# Patient Record
Sex: FEMALE | Race: WHITE | NOT HISPANIC OR LATINO
[De-identification: names, ages, dates, MRNs, and addresses within clinical notes are randomized per-mention and may not be internally consistent; named-entity substitution may affect disease eponyms.]

---

## 2017-01-03 ENCOUNTER — CHART COPY (OUTPATIENT)
Age: 52
End: 2017-01-03

## 2017-02-13 ENCOUNTER — APPOINTMENT (OUTPATIENT)
Dept: ORTHOPEDIC SURGERY | Facility: CLINIC | Age: 52
End: 2017-02-13

## 2017-02-13 RX ORDER — TRAMADOL HYDROCHLORIDE 50 MG/1
50 TABLET, COATED ORAL
Qty: 50 | Refills: 0 | Status: DISCONTINUED | COMMUNITY
Start: 2017-01-03 | End: 2017-02-13

## 2017-02-28 ENCOUNTER — MEDICATION RENEWAL (OUTPATIENT)
Age: 52
End: 2017-02-28

## 2017-03-07 ENCOUNTER — APPOINTMENT (OUTPATIENT)
Dept: ORTHOPEDIC SURGERY | Facility: CLINIC | Age: 52
End: 2017-03-07

## 2017-03-07 VITALS
DIASTOLIC BLOOD PRESSURE: 82 MMHG | BODY MASS INDEX: 22.22 KG/M2 | SYSTOLIC BLOOD PRESSURE: 125 MMHG | HEIGHT: 69 IN | HEART RATE: 69 BPM | WEIGHT: 150 LBS

## 2017-03-07 DIAGNOSIS — Z96.651 PRESENCE OF RIGHT ARTIFICIAL KNEE JOINT: ICD-10-CM

## 2017-03-07 DIAGNOSIS — M25.561 PAIN IN RIGHT KNEE: ICD-10-CM

## 2017-03-07 RX ORDER — ONDANSETRON 4 MG/1
4 TABLET ORAL
Qty: 20 | Refills: 0 | Status: DISCONTINUED | COMMUNITY
Start: 2016-12-06

## 2017-03-07 RX ORDER — ASPIRIN 325 MG/1
325 TABLET ORAL
Qty: 60 | Refills: 0 | Status: DISCONTINUED | COMMUNITY
Start: 2016-12-02

## 2017-03-07 RX ORDER — PANTOPRAZOLE 40 MG/1
40 TABLET, DELAYED RELEASE ORAL
Qty: 30 | Refills: 0 | Status: DISCONTINUED | COMMUNITY
Start: 2016-12-02

## 2017-03-07 RX ORDER — VALACYCLOVIR 1 G/1
1 TABLET, FILM COATED ORAL
Qty: 8 | Refills: 0 | Status: DISCONTINUED | COMMUNITY
Start: 2016-12-22

## 2018-04-18 ENCOUNTER — APPOINTMENT (OUTPATIENT)
Dept: ORTHOPEDIC SURGERY | Facility: CLINIC | Age: 53
End: 2018-04-18
Payer: MEDICARE

## 2018-04-18 VITALS
DIASTOLIC BLOOD PRESSURE: 79 MMHG | HEART RATE: 81 BPM | HEIGHT: 69 IN | BODY MASS INDEX: 25.18 KG/M2 | SYSTOLIC BLOOD PRESSURE: 119 MMHG | WEIGHT: 170 LBS

## 2018-04-18 DIAGNOSIS — Z82.61 FAMILY HISTORY OF ARTHRITIS: ICD-10-CM

## 2018-04-18 DIAGNOSIS — M24.851 OTHER SPECIFIC JOINT DERANGEMENTS OF RIGHT HIP, NOT ELSEWHERE CLASSIFIED: ICD-10-CM

## 2018-04-18 PROCEDURE — 73502 X-RAY EXAM HIP UNI 2-3 VIEWS: CPT

## 2018-04-18 PROCEDURE — 99214 OFFICE O/P EST MOD 30 MIN: CPT

## 2018-04-23 ENCOUNTER — APPOINTMENT (OUTPATIENT)
Dept: ORTHOPEDIC SURGERY | Facility: CLINIC | Age: 53
End: 2018-04-23
Payer: MEDICARE

## 2018-04-23 VITALS
HEIGHT: 69 IN | WEIGHT: 170 LBS | HEART RATE: 76 BPM | DIASTOLIC BLOOD PRESSURE: 78 MMHG | BODY MASS INDEX: 25.18 KG/M2 | SYSTOLIC BLOOD PRESSURE: 120 MMHG

## 2018-04-23 PROCEDURE — 99213 OFFICE O/P EST LOW 20 MIN: CPT

## 2018-04-23 PROCEDURE — 73562 X-RAY EXAM OF KNEE 3: CPT | Mod: RT

## 2018-12-10 ENCOUNTER — RECORD ABSTRACTING (OUTPATIENT)
Age: 53
End: 2018-12-10

## 2018-12-10 RX ORDER — AMOXICILLIN 500 MG/1
500 CAPSULE ORAL
Qty: 20 | Refills: 4 | Status: DISCONTINUED | COMMUNITY
Start: 2018-04-23 | End: 2018-12-10

## 2018-12-10 RX ORDER — AMOXICILLIN 500 MG/1
500 CAPSULE ORAL
Qty: 12 | Refills: 2 | Status: DISCONTINUED | COMMUNITY
Start: 2017-03-07 | End: 2018-12-10

## 2018-12-11 ENCOUNTER — APPOINTMENT (OUTPATIENT)
Dept: CARDIOLOGY | Facility: CLINIC | Age: 53
End: 2018-12-11
Payer: MEDICARE

## 2018-12-11 VITALS
WEIGHT: 176 LBS | OXYGEN SATURATION: 97 % | DIASTOLIC BLOOD PRESSURE: 64 MMHG | HEIGHT: 69 IN | RESPIRATION RATE: 14 BRPM | BODY MASS INDEX: 26.07 KG/M2 | HEART RATE: 80 BPM | SYSTOLIC BLOOD PRESSURE: 110 MMHG

## 2018-12-11 DIAGNOSIS — M54.2 CERVICALGIA: ICD-10-CM

## 2018-12-11 DIAGNOSIS — Z00.00 ENCOUNTER FOR GENERAL ADULT MEDICAL EXAMINATION W/OUT ABNORMAL FINDINGS: ICD-10-CM

## 2018-12-11 DIAGNOSIS — C83.30 DIFFUSE LARGE B-CELL LYMPHOMA, UNSPECIFIED SITE: ICD-10-CM

## 2018-12-11 DIAGNOSIS — R53.83 OTHER FATIGUE: ICD-10-CM

## 2018-12-11 PROCEDURE — 99204 OFFICE O/P NEW MOD 45 MIN: CPT

## 2018-12-11 PROCEDURE — 93000 ELECTROCARDIOGRAM COMPLETE: CPT

## 2018-12-11 NOTE — DISCUSSION/SUMMARY
[FreeTextEntry1] : 1. Echocardiogram to evaluate given her neck pain as well as history of chemotherapy.\par 2. Exercise stress echocardiogram to rule out a cardiac cause of her neck pain in this middle-aged female with a history of chemotherapy..\par 3. Carotid Doppler to evaluate further given the neck pain.\par 4. Patient encouraged  to find ways to reduce stress, such as meditation and/or yoga.\par 5. If testing is normal would recommend increasing exercise as a way to improve her symptoms and especially her fatigue.\par 6. Check lipids, hemoglobin A1c and CRP.\par 7. Follow up here after testing, and will make further recommendations at that time.

## 2018-12-11 NOTE — HISTORY OF PRESENT ILLNESS
[FreeTextEntry1] : Patient comes in noting since the summer she has been having episodes where she will feel a tightness around her neck almost as if someone is choking her. She has also had a lot of fatigue and is having difficulty exercising because she feels so tired. She also feels that her heart rate goes up excessively. She has gained about 20 pounds over the last year as well. She reports no actual chest pain or shortness of breath. She has no other specific symptoms. She certainly is feeling very stressed however. Patient denies palpitations, orthopnea, presyncope, syncope.

## 2018-12-11 NOTE — ASSESSMENT
[FreeTextEntry1] : EKG: Sinus rhythm with no significant ST or T wave changes.\par \par 53-year-old female with a past medical history of lymphoma status post chemotherapy who presents today for evaluation of neck pain and fatigue. Patient's symptoms are atypical and more likely represent a musculoskeletal or stress-induced cores, or possibly some component of reflux. However given her history of CHOP chemotherapy I would recommend additional cardiac evaluation at this time.

## 2018-12-13 ENCOUNTER — APPOINTMENT (OUTPATIENT)
Dept: CARDIOLOGY | Facility: CLINIC | Age: 53
End: 2018-12-13
Payer: MEDICARE

## 2018-12-13 PROCEDURE — 93351 STRESS TTE COMPLETE: CPT

## 2019-01-15 ENCOUNTER — APPOINTMENT (OUTPATIENT)
Dept: CARDIOLOGY | Facility: CLINIC | Age: 54
End: 2019-01-15
Payer: MEDICARE

## 2019-01-15 PROCEDURE — 93880 EXTRACRANIAL BILAT STUDY: CPT

## 2019-01-18 ENCOUNTER — APPOINTMENT (OUTPATIENT)
Dept: CARDIOLOGY | Facility: CLINIC | Age: 54
End: 2019-01-18
Payer: MEDICARE

## 2019-01-18 PROCEDURE — 93306 TTE W/DOPPLER COMPLETE: CPT

## 2019-02-07 ENCOUNTER — APPOINTMENT (OUTPATIENT)
Dept: INFECTIOUS DISEASE | Facility: HOSPITAL | Age: 54
End: 2019-02-07
Payer: SELF-PAY

## 2019-02-07 DIAGNOSIS — Z71.89 OTHER SPECIFIED COUNSELING: ICD-10-CM

## 2019-02-07 PROCEDURE — 90473 IMMUNE ADMIN ORAL/NASAL: CPT | Mod: NC

## 2019-02-07 PROCEDURE — 90690 TYPHOID VACCINE ORAL: CPT

## 2019-02-07 PROCEDURE — 90632 HEPA VACCINE ADULT IM: CPT

## 2019-02-07 PROCEDURE — 90472 IMMUNIZATION ADMIN EACH ADD: CPT | Mod: NC

## 2019-02-07 PROCEDURE — 99401 PREV MED CNSL INDIV APPRX 15: CPT | Mod: 25

## 2019-02-07 PROCEDURE — 90717 YELLOW FEVER VACCINE SUBQ: CPT

## 2019-02-26 ENCOUNTER — APPOINTMENT (OUTPATIENT)
Dept: INFECTIOUS DISEASE | Facility: CLINIC | Age: 54
End: 2019-02-26
Payer: MEDICARE

## 2019-02-26 VITALS
WEIGHT: 182 LBS | BODY MASS INDEX: 26.88 KG/M2 | SYSTOLIC BLOOD PRESSURE: 117 MMHG | DIASTOLIC BLOOD PRESSURE: 72 MMHG | HEART RATE: 75 BPM | OXYGEN SATURATION: 97 % | TEMPERATURE: 98.3 F

## 2019-02-26 PROCEDURE — 90734 MENACWYD/MENACWYCRM VACC IM: CPT

## 2019-02-26 PROCEDURE — 90472 IMMUNIZATION ADMIN EACH ADD: CPT

## 2019-02-26 PROCEDURE — 90732 PPSV23 VACC 2 YRS+ SUBQ/IM: CPT

## 2019-02-26 PROCEDURE — G0009: CPT

## 2019-02-26 PROCEDURE — 99203 OFFICE O/P NEW LOW 30 MIN: CPT | Mod: 25

## 2019-03-08 NOTE — PHYSICAL EXAM
[General Appearance - Well-Appearing] : healthy appearing [Sclera] : the sclera and conjunctiva were normal [Oropharynx] : the oropharynx was normal with no thrush [Respiration, Rhythm And Depth] : normal respiratory rhythm and effort [Auscultation Breath Sounds / Voice Sounds] : lungs were clear to auscultation bilaterally [Heart Rate And Rhythm] : heart rate was normal and rhythm regular [Heart Sounds] : normal S1 and S2

## 2019-03-08 NOTE — HISTORY OF PRESENT ILLNESS
[FreeTextEntry1] : 54 yo woman w/ Waldenstrom macroglobulinemia, enlarged spleen removed 1997, diagnosed large b cell lymphoma 2006 received R- CHOP . Now well.  Continues with follow up [facility].  Seen in travel clinic 2/7/19 for vaccinations and malaria prophylaxis prescriptions  in preparation for trip to [facility] for medical mission. \par Now here for evaluation for vaccines as part of routine medical maintenance post splenectomy.\par Has vaccine records from [facility] indicating received prevnar in 2013, HIB,  pneumovax 2006, meningococcus 2013.

## 2019-03-08 NOTE — ASSESSMENT
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 \par Has had prevnar 2013 at [facility], HIB, pneumovax 2006, meningococcal 2013.\par \par Plan: pneumovax\par          menatra\par          return in 5 years for boosters

## 2022-03-03 ENCOUNTER — APPOINTMENT (OUTPATIENT)
Dept: OBGYN | Facility: CLINIC | Age: 57
End: 2022-03-03

## 2022-10-04 ENCOUNTER — NON-APPOINTMENT (OUTPATIENT)
Age: 57
End: 2022-10-04

## 2022-11-15 ENCOUNTER — APPOINTMENT (OUTPATIENT)
Dept: OBGYN | Facility: CLINIC | Age: 57
End: 2022-11-15

## 2022-11-15 VITALS
DIASTOLIC BLOOD PRESSURE: 66 MMHG | HEART RATE: 85 BPM | OXYGEN SATURATION: 99 % | RESPIRATION RATE: 18 BRPM | SYSTOLIC BLOOD PRESSURE: 104 MMHG | HEIGHT: 69 IN | WEIGHT: 168 LBS | BODY MASS INDEX: 24.88 KG/M2

## 2022-11-15 DIAGNOSIS — C88.0 WALDENSTROM MACROGLOBULINEMIA: ICD-10-CM

## 2022-11-15 DIAGNOSIS — Z85.72 PERSONAL HISTORY OF NON-HODGKIN LYMPHOMAS: ICD-10-CM

## 2022-11-15 DIAGNOSIS — Z12.39 ENCOUNTER FOR OTHER SCREENING FOR MALIGNANT NEOPLASM OF BREAST: ICD-10-CM

## 2022-11-15 DIAGNOSIS — Z12.11 ENCOUNTER FOR SCREENING FOR MALIGNANT NEOPLASM OF COLON: ICD-10-CM

## 2022-11-15 PROCEDURE — 81002 URINALYSIS NONAUTO W/O SCOPE: CPT

## 2022-11-15 PROCEDURE — 99213 OFFICE O/P EST LOW 20 MIN: CPT | Mod: 25

## 2022-11-15 PROCEDURE — 99396 PREV VISIT EST AGE 40-64: CPT

## 2022-11-15 NOTE — DISCUSSION/SUMMARY
[FreeTextEntry1] : Annual gyn [facility]\par Urinary frequency\par not affecting quality of life\par urine cx sent\par UA in office negative\par pt counselled

## 2022-11-15 NOTE — REVIEW OF SYSTEMS
[Patient Intake Form Reviewed] : Patient intake form was reviewed [Negative] : Heme/Lymph [FreeTextEntry8] : urinary frequency

## 2022-11-21 ENCOUNTER — NON-APPOINTMENT (OUTPATIENT)
Age: 57
End: 2022-11-21

## 2022-11-21 LAB — HPV HIGH+LOW RISK DNA PNL CVX: DETECTED

## 2022-11-28 LAB — CYTOLOGY CVX/VAG DOC THIN PREP: ABNORMAL

## 2022-12-05 ENCOUNTER — NON-APPOINTMENT (OUTPATIENT)
Age: 57
End: 2022-12-05

## 2023-01-10 ENCOUNTER — APPOINTMENT (OUTPATIENT)
Dept: OBGYN | Facility: CLINIC | Age: 58
End: 2023-01-10
Payer: MEDICARE

## 2023-01-10 VITALS
HEIGHT: 69 IN | SYSTOLIC BLOOD PRESSURE: 107 MMHG | DIASTOLIC BLOOD PRESSURE: 70 MMHG | BODY MASS INDEX: 25.62 KG/M2 | HEART RATE: 80 BPM | WEIGHT: 173 LBS

## 2023-01-10 DIAGNOSIS — R35.0 FREQUENCY OF MICTURITION: ICD-10-CM

## 2023-01-10 PROCEDURE — 57454 BX/CURETT OF CERVIX W/SCOPE: CPT

## 2023-01-10 NOTE — PROCEDURE
[Colposcopy] : Colposcopy  [Time out performed] : Pre-procedure time out performed.  Patient's name, date of birth and procedure confirmed. [Consent Obtained] : Consent obtained [Risks] : risks [Benefits] : benefits [Alternatives] : alternatives [Patient] : patient [Infection] : infection [Bleeding] : bleeding [Allergic Reaction] : allergic reaction [Hemostasis Obtained] : Hemostasis obtained [Tolerated Well] : the patient tolerated the procedure well [HPV High Risk] : HPV high risk [Colposcopy Adequate] : colposcopy adequate [Pap Performed] : pap not performed [SCI Fully Visualized] : SCI fully visualized [ECC Performed] : ECC performed [No Abnormalities] : no abnormalities [de-identified] : Normal colpsoscopy\par pt counselled \par ECC performed

## 2023-01-12 LAB — BACTERIA UR CULT: NORMAL

## 2023-01-26 LAB — CORE LAB BIOPSY: NORMAL

## 2023-04-03 ENCOUNTER — NON-APPOINTMENT (OUTPATIENT)
Age: 58
End: 2023-04-03

## 2023-04-11 ENCOUNTER — APPOINTMENT (OUTPATIENT)
Dept: OBGYN | Facility: CLINIC | Age: 58
End: 2023-04-11
Payer: MEDICARE

## 2023-04-11 VITALS
BODY MASS INDEX: 26.07 KG/M2 | HEART RATE: 81 BPM | DIASTOLIC BLOOD PRESSURE: 72 MMHG | HEIGHT: 69 IN | SYSTOLIC BLOOD PRESSURE: 112 MMHG | WEIGHT: 176 LBS

## 2023-04-11 DIAGNOSIS — Z01.419 ENCOUNTER FOR GYNECOLOGICAL EXAMINATION (GENERAL) (ROUTINE) W/OUT ABNORMAL FINDINGS: ICD-10-CM

## 2023-04-11 PROCEDURE — 57454 BX/CURETT OF CERVIX W/SCOPE: CPT

## 2023-04-11 NOTE — PROCEDURE
[Colposcopy] : Colposcopy  [Time out performed] : Pre-procedure time out performed.  Patient's name, date of birth and procedure confirmed. [Consent Obtained] : Consent obtained [Risks] : risks [Benefits] : benefits [Alternatives] : alternatives [Patient] : patient [Infection] : infection [Bleeding] : bleeding [Allergic Reaction] : allergic reaction [Hemostasis Obtained] : Hemostasis obtained [Tolerated Well] : the patient tolerated the procedure well [HGSIL] : HGSIL [HPV High Risk] : HPV high risk [No Premedication] : no premedication [Colposcopy Adequate] : colposcopy adequate [Pap Performed] : pap performed [SCI Fully Visualized] : SCI fully visualized [ECC Performed] : ECC performed [No Abnormalities] : no abnormalities [Biopsy] : biopsy not taken [de-identified] : [facility] 1/2023 [de-identified] : Lugol's solution revealed no abnormalities [de-identified] : Normal colposcopy\par pt had pap WNL with +HR HPV\par colposcopic ECC revealed a scant amount of HGSIL\par pt counselled\par

## 2023-04-22 LAB
CYTOLOGY CVX/VAG DOC THIN PREP: ABNORMAL
HPV HIGH+LOW RISK DNA PNL CVX: DETECTED

## 2023-04-25 ENCOUNTER — TRANSCRIPTION ENCOUNTER (OUTPATIENT)
Age: 58
End: 2023-04-25

## 2023-04-27 ENCOUNTER — NON-APPOINTMENT (OUTPATIENT)
Age: 58
End: 2023-04-27

## 2023-04-28 DIAGNOSIS — Z13.820 ENCOUNTER FOR SCREENING FOR OSTEOPOROSIS: ICD-10-CM

## 2023-05-22 PROBLEM — B97.7 HIGH RISK HPV INFECTION: Status: ACTIVE | Noted: 2023-01-10

## 2023-05-23 ENCOUNTER — APPOINTMENT (OUTPATIENT)
Dept: GYNECOLOGIC ONCOLOGY | Facility: CLINIC | Age: 58
End: 2023-05-23
Payer: MEDICARE

## 2023-05-23 VITALS
WEIGHT: 172.38 LBS | SYSTOLIC BLOOD PRESSURE: 118 MMHG | HEART RATE: 80 BPM | BODY MASS INDEX: 25.53 KG/M2 | HEIGHT: 69 IN | DIASTOLIC BLOOD PRESSURE: 76 MMHG

## 2023-05-23 DIAGNOSIS — Z85.72 PERSONAL HISTORY OF NON-HODGKIN LYMPHOMAS: ICD-10-CM

## 2023-05-23 DIAGNOSIS — Z78.9 OTHER SPECIFIED HEALTH STATUS: ICD-10-CM

## 2023-05-23 DIAGNOSIS — B97.7 PAPILLOMAVIRUS AS THE CAUSE OF DISEASES CLASSIFIED ELSEWHERE: ICD-10-CM

## 2023-05-23 DIAGNOSIS — Z80.9 FAMILY HISTORY OF MALIGNANT NEOPLASM, UNSPECIFIED: ICD-10-CM

## 2023-05-23 PROCEDURE — 99205 OFFICE O/P NEW HI 60 MIN: CPT

## 2023-05-23 RX ORDER — AZITHROMYCIN 250 MG/1
250 TABLET, FILM COATED ORAL
Qty: 4 | Refills: 0 | Status: DISCONTINUED | COMMUNITY
Start: 2019-02-07 | End: 2023-05-23

## 2023-05-23 RX ORDER — ATOVAQUONE AND PROGUANIL HYDROCHLORIDE 250; 100 MG/1; MG/1
250-100 TABLET, FILM COATED ORAL DAILY
Qty: 16 | Refills: 0 | Status: DISCONTINUED | COMMUNITY
Start: 2019-02-07 | End: 2023-05-23

## 2023-05-23 RX ORDER — NITROFURANTOIN (MONOHYDRATE/MACROCRYSTALS) 25; 75 MG/1; MG/1
100 CAPSULE ORAL
Qty: 10 | Refills: 0 | Status: DISCONTINUED | COMMUNITY
Start: 2022-11-19 | End: 2023-05-23

## 2023-06-01 ENCOUNTER — OUTPATIENT (OUTPATIENT)
Dept: OUTPATIENT SERVICES | Facility: HOSPITAL | Age: 58
LOS: 1 days | End: 2023-06-01

## 2023-06-01 DIAGNOSIS — Z98.890 OTHER SPECIFIED POSTPROCEDURAL STATES: Chronic | ICD-10-CM

## 2023-06-01 DIAGNOSIS — Z87.798 PERSONAL HISTORY OF OTHER (CORRECTED) CONGENITAL MALFORMATIONS: Chronic | ICD-10-CM

## 2023-06-01 DIAGNOSIS — B97.7 PAPILLOMAVIRUS AS THE CAUSE OF DISEASES CLASSIFIED ELSEWHERE: ICD-10-CM

## 2023-06-01 DIAGNOSIS — Z90.81 ACQUIRED ABSENCE OF SPLEEN: Chronic | ICD-10-CM

## 2023-06-01 DIAGNOSIS — N87.9 DYSPLASIA OF CERVIX UTERI, UNSPECIFIED: ICD-10-CM

## 2023-06-01 DIAGNOSIS — Z96.659 PRESENCE OF UNSPECIFIED ARTIFICIAL KNEE JOINT: Chronic | ICD-10-CM

## 2023-06-01 LAB
ALBUMIN SERPL ELPH-MCNC: 4.5 G/DL — SIGNIFICANT CHANGE UP (ref 3.3–5)
ALP SERPL-CCNC: 47 U/L — SIGNIFICANT CHANGE UP (ref 40–120)
ALT FLD-CCNC: 11 U/L — SIGNIFICANT CHANGE UP (ref 4–33)
ANION GAP SERPL CALC-SCNC: 10 MMOL/L — SIGNIFICANT CHANGE UP (ref 7–14)
AST SERPL-CCNC: 16 U/L — SIGNIFICANT CHANGE UP (ref 4–32)
BILIRUB SERPL-MCNC: <0.2 MG/DL — SIGNIFICANT CHANGE UP (ref 0.2–1.2)
BUN SERPL-MCNC: 12 MG/DL — SIGNIFICANT CHANGE UP (ref 7–23)
CALCIUM SERPL-MCNC: 9.3 MG/DL — SIGNIFICANT CHANGE UP (ref 8.4–10.5)
CHLORIDE SERPL-SCNC: 104 MMOL/L — SIGNIFICANT CHANGE UP (ref 98–107)
CO2 SERPL-SCNC: 25 MMOL/L — SIGNIFICANT CHANGE UP (ref 22–31)
CREAT SERPL-MCNC: 0.59 MG/DL — SIGNIFICANT CHANGE UP (ref 0.5–1.3)
EGFR: 106 ML/MIN/1.73M2 — SIGNIFICANT CHANGE UP
GLUCOSE SERPL-MCNC: 85 MG/DL — SIGNIFICANT CHANGE UP (ref 70–99)
HCG SERPL-ACNC: 1.8 MIU/ML — SIGNIFICANT CHANGE UP
HCT VFR BLD CALC: 40.2 % — SIGNIFICANT CHANGE UP (ref 34.5–45)
HGB BLD-MCNC: 13 G/DL — SIGNIFICANT CHANGE UP (ref 11.5–15.5)
MCHC RBC-ENTMCNC: 31.3 PG — SIGNIFICANT CHANGE UP (ref 27–34)
MCHC RBC-ENTMCNC: 32.3 GM/DL — SIGNIFICANT CHANGE UP (ref 32–36)
MCV RBC AUTO: 96.9 FL — SIGNIFICANT CHANGE UP (ref 80–100)
NRBC # BLD: 0 /100 WBCS — SIGNIFICANT CHANGE UP (ref 0–0)
NRBC # FLD: 0 K/UL — SIGNIFICANT CHANGE UP (ref 0–0)
PLATELET # BLD AUTO: 297 K/UL — SIGNIFICANT CHANGE UP (ref 150–400)
POTASSIUM SERPL-MCNC: 4.2 MMOL/L — SIGNIFICANT CHANGE UP (ref 3.5–5.3)
POTASSIUM SERPL-SCNC: 4.2 MMOL/L — SIGNIFICANT CHANGE UP (ref 3.5–5.3)
PROT SERPL-MCNC: 7.1 G/DL — SIGNIFICANT CHANGE UP (ref 6–8.3)
RBC # BLD: 4.15 M/UL — SIGNIFICANT CHANGE UP (ref 3.8–5.2)
RBC # FLD: 13.3 % — SIGNIFICANT CHANGE UP (ref 10.3–14.5)
SODIUM SERPL-SCNC: 139 MMOL/L — SIGNIFICANT CHANGE UP (ref 135–145)
WBC # BLD: 7.1 K/UL — SIGNIFICANT CHANGE UP (ref 3.8–10.5)
WBC # FLD AUTO: 7.1 K/UL — SIGNIFICANT CHANGE UP (ref 3.8–10.5)

## 2023-06-01 RX ORDER — SODIUM CHLORIDE 9 MG/ML
1000 INJECTION, SOLUTION INTRAVENOUS
Refills: 0 | Status: DISCONTINUED | OUTPATIENT
Start: 2023-06-05 | End: 2023-06-19

## 2023-06-01 NOTE — H&P PST ADULT - PROBLEM SELECTOR PLAN 1
Patient tentatively scheduled for cold knife cone biopsy , endocervical curettage on 6/5/23.  Pre-op instructions provided. Pt given verbal and written instructions with teach back on pepcid. Pt verbalized understanding with return demonstration.   Urine cup provided for day of procedure for pregnancy test.

## 2023-06-01 NOTE — H&P PST ADULT - HISTORY OF PRESENT ILLNESS
57 year old female with PMH of lymphoma s/p chemo 15 year ago, , presents to Presurgical testing with diagnosis of dysplasia of cervix uteri unspecified  scheduled for cold knife cone biopsy ,  57 year old female with PMH of lymphoma s/p chemo - 2018, , presents to Presurgical testing with diagnosis of dysplasia of cervix uteri unspecified  scheduled for cold knife cone biopsy ,

## 2023-06-01 NOTE — H&P PST ADULT - ATTENDING COMMENTS
Patient seen in ASU with her , no changes reported. Consent form reviewed including by learner. All questions answered, case reviewed with circulating LIZY Mcgill.     Jessi Manuel MD

## 2023-06-01 NOTE — H&P PST ADULT - NSICDXPASTSURGICALHX_GEN_ALL_CORE_FT
PAST SURGICAL HISTORY:  H/O inguinal hernia repair     H/O ovarian cystectomy     H/O removal of thyroglossal duct cyst     H/O splenectomy     H/O total knee replacement

## 2023-06-01 NOTE — H&P PST ADULT - NSICDXFAMILYHX_GEN_ALL_CORE_FT
FAMILY HISTORY:  Father  Still living? Yes, Estimated age: 81-90  Family history of stroke, Age at diagnosis: Age Unknown    Mother  Still living? Yes, Estimated age: 71-80  Family history of hypertension in mother, Age at diagnosis: Age Unknown

## 2023-06-01 NOTE — H&P PST ADULT - NSICDXPASTMEDICALHX_GEN_ALL_CORE_FT
PAST MEDICAL HISTORY:  History of lymphangioma 1987     PAST MEDICAL HISTORY:  History of lymphangioma 1987    Lymphoma

## 2023-06-02 ENCOUNTER — NON-APPOINTMENT (OUTPATIENT)
Age: 58
End: 2023-06-02

## 2023-06-02 NOTE — ASU PATIENT PROFILE, ADULT - FALL HARM RISK - UNIVERSAL INTERVENTIONS
Bed in lowest position, wheels locked, appropriate side rails in place/Call bell, personal items and telephone in reach/Instruct patient to call for assistance before getting out of bed or chair/Non-slip footwear when patient is out of bed/[place] to call system/Physically safe environment - no spills, clutter or unnecessary equipment/Purposeful Proactive Rounding/Room/bathroom lighting operational, light cord in reach

## 2023-06-04 ENCOUNTER — TRANSCRIPTION ENCOUNTER (OUTPATIENT)
Age: 58
End: 2023-06-04

## 2023-06-05 ENCOUNTER — APPOINTMENT (OUTPATIENT)
Dept: GYNECOLOGIC ONCOLOGY | Facility: HOSPITAL | Age: 58
End: 2023-06-05

## 2023-06-05 ENCOUNTER — TRANSCRIPTION ENCOUNTER (OUTPATIENT)
Age: 58
End: 2023-06-05

## 2023-06-05 ENCOUNTER — OUTPATIENT (OUTPATIENT)
Dept: OUTPATIENT SERVICES | Facility: HOSPITAL | Age: 58
LOS: 1 days | Discharge: HOME | End: 2023-06-05
Payer: MEDICARE

## 2023-06-05 ENCOUNTER — RESULT REVIEW (OUTPATIENT)
Age: 58
End: 2023-06-05

## 2023-06-05 VITALS
OXYGEN SATURATION: 99 % | RESPIRATION RATE: 15 BRPM | DIASTOLIC BLOOD PRESSURE: 75 MMHG | WEIGHT: 169.98 LBS | TEMPERATURE: 98 F | SYSTOLIC BLOOD PRESSURE: 116 MMHG | HEART RATE: 77 BPM | HEIGHT: 69 IN

## 2023-06-05 VITALS
OXYGEN SATURATION: 97 % | HEART RATE: 81 BPM | SYSTOLIC BLOOD PRESSURE: 116 MMHG | RESPIRATION RATE: 15 BRPM | DIASTOLIC BLOOD PRESSURE: 50 MMHG

## 2023-06-05 DIAGNOSIS — Z98.890 OTHER SPECIFIED POSTPROCEDURAL STATES: Chronic | ICD-10-CM

## 2023-06-05 DIAGNOSIS — Z96.659 PRESENCE OF UNSPECIFIED ARTIFICIAL KNEE JOINT: Chronic | ICD-10-CM

## 2023-06-05 DIAGNOSIS — B97.7 PAPILLOMAVIRUS AS THE CAUSE OF DISEASES CLASSIFIED ELSEWHERE: ICD-10-CM

## 2023-06-05 DIAGNOSIS — Z90.81 ACQUIRED ABSENCE OF SPLEEN: Chronic | ICD-10-CM

## 2023-06-05 DIAGNOSIS — Z87.798 PERSONAL HISTORY OF OTHER (CORRECTED) CONGENITAL MALFORMATIONS: Chronic | ICD-10-CM

## 2023-06-05 LAB — HCG UR QL: NEGATIVE — SIGNIFICANT CHANGE UP

## 2023-06-05 PROCEDURE — 88305 TISSUE EXAM BY PATHOLOGIST: CPT | Mod: 26

## 2023-06-05 PROCEDURE — 88342 IMHCHEM/IMCYTCHM 1ST ANTB: CPT | Mod: 26

## 2023-06-05 PROCEDURE — 88307 TISSUE EXAM BY PATHOLOGIST: CPT | Mod: 26

## 2023-06-05 PROCEDURE — 57520 CONIZATION OF CERVIX: CPT

## 2023-06-05 DEVICE — SURGICEL 2 X 14": Type: IMPLANTABLE DEVICE | Status: FUNCTIONAL

## 2023-06-05 RX ORDER — FENTANYL CITRATE 50 UG/ML
50 INJECTION INTRAVENOUS
Refills: 0 | Status: DISCONTINUED | OUTPATIENT
Start: 2023-06-05 | End: 2023-06-05

## 2023-06-05 RX ORDER — ONDANSETRON 8 MG/1
4 TABLET, FILM COATED ORAL ONCE
Refills: 0 | Status: DISCONTINUED | OUTPATIENT
Start: 2023-06-05 | End: 2023-06-19

## 2023-06-05 NOTE — ASU DISCHARGE PLAN (ADULT/PEDIATRIC) - CARE PROVIDER_API CALL
Jessi Manuel  Obstetrics and Gynecology  [address] [place] [id number]  Phone: [phone]  Fax: [phone]  Follow Up Time: 2 weeks

## 2023-06-05 NOTE — ASU DISCHARGE PLAN (ADULT/PEDIATRIC) - NURSING INSTRUCTIONS
Monsel solution with absorbing fabric in the cervix, if this falls out no concern for replacement. Monsel solution with absorbing fabric in the cervix, if this falls out no concern for replacement.  DO NOT take any Tylenol (Acetaminophen) or narcotics containing Tylenol until after 4:35pm . You received Tylenol during your operation and it can cause damage to your liver if too much is taken within a 24 hour time period. DO NOT take any Ibuprofen ,Advil or Aleeve until after 4:45pm . You received Toradol during your operation and it can cause damage if too much is taken within a 24 hour time period.

## 2023-06-05 NOTE — ASU DISCHARGE PLAN (ADULT/PEDIATRIC) - ASU DC SPECIAL INSTRUCTIONSFT
After your surgery it is normal to experience:    •	Vaginal bleeding- can last 1-2 weeks and should not be heavier than a period. It may come and go and be red, brown or pink. Use pads, not tampons.  •	Cramping- Is common especially within the first 24 hours. This should be relieved by taking over the counter motrin, advil or Tylenol.  •	Vaginal soreness/irritation- can occur in the first few days after surgery because of the instruments that were used in the vagina. Soreness can be treated with ice pack and irritation can be taken care of with an emollient such as balmex or aquaphor that you can put directly on the irritated area.    Restrictions: Until cleared by doctor after the surgery you should avoid the following:    •	Tampons  •	Sex  •	Vigorous gym exercise  •	Swimming  pools and tub baths  •	Wait a day or two before going back to work    Anesthesia Precautions:  For the next 12 hours do not:   •	drive a car,  •	 operate power tools or machinery,  •	drink alcohol, beer, or wine,   •	make important personal or business decisions    Diet:   •	Resume Regular diet but Progress diet slowly     Physician Notification- Warning signs to look out for  •	Heavy Vaginal Bleeding   •	Shortness of breath or chest pain  •	Severe Abdominal Pain  •	Persistent nausea and vomiting  •	Pain not relieved by medications  •	Fever greater than 100.5®F  •	Inability to tolerate liquids or foods  •	Unable to urinate after 8 hours

## 2023-06-05 NOTE — ASU DISCHARGE PLAN (ADULT/PEDIATRIC) - NS MD DC FALL RISK RISK
For information on Fall & Injury Prevention, visit: [email or web address] OR  [email or web address] OR  https://www.cdc.gov/steadi/patient.html

## 2023-06-05 NOTE — BRIEF OPERATIVE NOTE - OPERATION/FINDINGS
Normal external genitalia  Normal vaginal mucosa   Cervix with visible cervical os. With punctate 2mm non vascularized lesion at the 7 o'clock position. Normal external genitalia  Normal vaginal mucosa   Cervix with visible cervical os. With punctate 2mm non vascularized lesion at the 7 o'clock position.  CKC performed without complication, followed by ECC.

## 2023-06-07 ENCOUNTER — NON-APPOINTMENT (OUTPATIENT)
Age: 58
End: 2023-06-07

## 2023-06-14 LAB — SURGICAL PATHOLOGY STUDY: SIGNIFICANT CHANGE UP

## 2023-06-25 PROBLEM — Z48.89 POSTOPERATIVE VISIT: Status: ACTIVE | Noted: 2023-06-25

## 2023-06-27 ENCOUNTER — APPOINTMENT (OUTPATIENT)
Dept: GYNECOLOGIC ONCOLOGY | Facility: CLINIC | Age: 58
End: 2023-06-27
Payer: MEDICARE

## 2023-06-27 VITALS
HEART RATE: 76 BPM | WEIGHT: 173.13 LBS | HEIGHT: 69 IN | SYSTOLIC BLOOD PRESSURE: 119 MMHG | TEMPERATURE: 97.6 F | DIASTOLIC BLOOD PRESSURE: 76 MMHG | BODY MASS INDEX: 25.64 KG/M2

## 2023-06-27 DIAGNOSIS — N87.9 DYSPLASIA OF CERVIX UTERI, UNSPECIFIED: ICD-10-CM

## 2023-06-27 DIAGNOSIS — Z48.89 ENCOUNTER FOR OTHER SPECIFIED SURGICAL AFTERCARE: ICD-10-CM

## 2023-06-27 PROBLEM — C85.90 NON-HODGKIN LYMPHOMA, UNSPECIFIED, UNSPECIFIED SITE: Chronic | Status: ACTIVE | Noted: 2023-06-01

## 2023-06-27 PROCEDURE — 99024 POSTOP FOLLOW-UP VISIT: CPT

## 2023-06-30 LAB — BACTERIA UR CULT: NORMAL

## 2023-11-06 ENCOUNTER — APPOINTMENT (OUTPATIENT)
Dept: OBGYN | Facility: CLINIC | Age: 58
End: 2023-11-06

## 2024-03-25 PROBLEM — N87.1 HIGH GRADE SQUAMOUS INTRAEPITHELIAL LESION (HGSIL), GRADE 2 CIN, ON BIOPSY OF CERVIX: Status: ACTIVE | Noted: 2023-04-11

## 2024-03-25 NOTE — HISTORY OF PRESENT ILLNESS
[FreeTextEntry1] : GYN/Ref: Dr. Sabine Rebolledo PCP: Dr. Luis Fernando Wu Med Onc: Dr. Domingo Arguello ([facility] [place])  Ms. Nolasco, 58 years old, status post cone biopsy on 2023 for high-grade cervical dysplasia presents today for follow-up Pap smear.  Final pathology: 2023: Biopsy ([facility])  1. Cervix, 12 o'clock; cone biopsy: - Inflamed squamous and endocervical mucosa. - Changes related to prior biopsy. Note: Deeper levels were performed on block 1D. Immunohistochemical stain for p16 was also performed on block 1D. Each block has been reversed 180 degree and sectioned for microscopic examination. 2. Endocervix; curettings: - Unremarkable endocervical epithelium.  POB:  (1 TOP; 3 vaginal deliveries, ages 28, 24, 22) PGYN: Menarche age 18; LMP age 52 PMH: macroglobuinemia age 32; B cell large cell lymphoma age 42 Meds: none Allergies: NKDA PSH: RIH repair as a child (unknown mesh); thyroglossal duct cyst excision at age 9; lymphangioma excision age 16;  open ovarian cystectomy; right TKR; splenectomy at age 32. Social Hx: lives with her  and children; non smoker, 2 glasses of wine per night; no drugs. FH: mother breast cancer age 70 (negative for cancer gene mutation); maternal grandmother breast cancer age 50; sister breast cancer age 53; sister breast cancer age 46.

## 2024-03-26 ENCOUNTER — APPOINTMENT (OUTPATIENT)
Dept: GYNECOLOGIC ONCOLOGY | Facility: CLINIC | Age: 59
End: 2024-03-26

## 2024-03-26 DIAGNOSIS — N87.1 MODERATE CERVICAL DYSPLASIA: ICD-10-CM

## 2024-08-13 ENCOUNTER — APPOINTMENT (OUTPATIENT)
Dept: GYNECOLOGIC ONCOLOGY | Facility: CLINIC | Age: 59
End: 2024-08-13
Payer: MEDICARE

## 2024-08-13 VITALS
OXYGEN SATURATION: 98 % | HEART RATE: 76 BPM | DIASTOLIC BLOOD PRESSURE: 70 MMHG | WEIGHT: 170 LBS | HEIGHT: 69 IN | RESPIRATION RATE: 16 BRPM | SYSTOLIC BLOOD PRESSURE: 112 MMHG | TEMPERATURE: 97.5 F | BODY MASS INDEX: 25.18 KG/M2

## 2024-08-13 DIAGNOSIS — R35.0 FREQUENCY OF MICTURITION: ICD-10-CM

## 2024-08-13 PROCEDURE — 99214 OFFICE O/P EST MOD 30 MIN: CPT

## 2024-08-13 NOTE — ASSESSMENT
[FreeTextEntry1] : 59 yo s/p cone biopsy on 6/5/2023 for high-grade cervical dysplasia presents here today for follow-up Pap smear and surveillance visit.

## 2024-08-13 NOTE — PHYSICAL EXAM
[FreeTextEntry1] : Tayler ORDAZ [de-identified] : atrophy globally [de-identified] : scant ectocervix c/w prior conization

## 2024-08-13 NOTE — HISTORY OF PRESENT ILLNESS
[FreeTextEntry1] : GYN/Ref: Dr. Sabine Rebolledo PCP: Dr. Luis Fernando Wu Med Onc: Dr. Domingo Arguello ([facility] [place])  Ms. Nolasco, 58 years old, status post cone biopsy on 2023 for high-grade cervical dysplasia presents here today for follow-up Pap smear and surveillance visit. She is going well and without complaint. She denies fevers, chills, night sweats, chest pain, SOB, changes in appetite, nausea, vomiting, increased abdominal girth, unintentional weight loss, abdominopelvic pain, lower extremity edema or pain and changes in bowel/bladder movements. Denies vaginal bleeding and abnormal vaginal discharge.   Final pathology: 2023: Biopsy ([facility])  1. Cervix, 12 o'clock; cone biopsy: - Inflamed squamous and endocervical mucosa. - Changes related to prior biopsy. Note: Deeper levels were performed on block 1D. Immunohistochemical stain for p16 was also performed on block 1D. Each block has been reversed 180 degree and sectioned for microscopic examination. 2. Endocervix; curettings: - Unremarkable endocervical epithelium.  POB:  (1 TOP; 3 vaginal deliveries, ages 28, 24, 22) PGYN: Menarche age 18; LMP age 52 PMH: macroglobuinemia age 32; B cell large cell lymphoma age 42 Meds: none Allergies: NKDA PSH: RIH repair as a child (unknown mesh); thyroglossal duct cyst excision at age 9; lymphangioma excision age 16;  open ovarian cystectomy; right TKR; splenectomy at age 32. Social Hx: lives with her  and children; non smoker, 2 glasses of wine per night; no drugs. FH: mother breast cancer age 70 (negative for cancer gene mutation); maternal grandmother breast cancer age 50; sister breast cancer age 53; sister breast cancer age 46.

## 2024-08-13 NOTE — PHYSICAL EXAM
[FreeTextEntry1] : Tayler ORDAZ [de-identified] : atrophy globally [de-identified] : scant ectocervix c/w prior conization

## 2024-08-13 NOTE — HISTORY OF PRESENT ILLNESS
[FreeTextEntry1] : GYN/Ref: Dr. Sabine Rebolledo PCP: Dr. Luis Fernando Wu Med Onc: Dr. Domingo Arguello ([facility] [place])  Ms. Nolasco, 58 years old, status post cone biopsy on 2023 for high-grade cervical dysplasia presents here today for follow-up Pap smear and surveillance visit. She is going well and without complaint. She denies fevers, chills, night sweats, chest pain, SOB, changes in appetite, nausea, vomiting, increased abdominal girth, unintentional weight loss, abdominopelvic pain, lower extremity edema or pain and changes in bowel/bladder movements. Denies vaginal bleeding and abnormal vaginal discharge.   Final pathology: 2023: Biopsy ([facility])  1. Cervix, 12 o'clock; cone biopsy: - Inflamed squamous and endocervical mucosa. - Changes related to prior biopsy. Note: Deeper levels were performed on block 1D. Immunohistochemical stain for p16 was also performed on block 1D. Each block has been reversed 180 degree and sectioned for microscopic examination. 2. Endocervix; curettings: - Unremarkable endocervical epithelium.  POB:  (1 TOP; 3 vaginal deliveries, ages 28, 24, 22) PGYN: Menarche age 18; LMP age 52 PMH: macroglobuinemia age 32; B cell large cell lymphoma age 42 Meds: none Allergies: NKDA PSH: RIH repair as a child (unknown mesh); thyroglossal duct cyst excision at age 9; lymphangioma excision age 16;  open ovarian cystectomy; right TKR; splenectomy at age 32. Social Hx: lives with her  and children; non smoker, 2 glasses of wine per night; no drugs. FH: mother breast cancer age 70 (negative for cancer gene mutation); maternal grandmother breast cancer age 50; sister breast cancer age 53; sister breast cancer age 46.

## 2024-08-13 NOTE — PHYSICAL EXAM
[FreeTextEntry1] : Tayler ORDAZ [de-identified] : atrophy globally [de-identified] : scant ectocervix c/w prior conization

## 2024-08-13 NOTE — DISCUSSION/SUMMARY
[Discuss Alternatives/Risks/Benefits w/Patient] : All alternatives, risks, and benefits were discussed with the patient/family and all questions were answered.  Patient expressed good understanding and appreciates the importance of follow up as recommended. [Visit Time ___ Minutes] : [unfilled] minutes [Face to Face Time___ Minutes] : with [unfilled] minutes in face to face consultation. [FreeTextEntry1] : - Patients history and work up to date reviewed in detail. - Pap smear done today. - Mammogram ordered in accordance with HCM.  - Discussed in detail the diagnosis and risk of progression and/or concurrent invasive disease of HSIL. Patient counseled that most infections detected over the years of screening are reactivations of latent infections that are acquired at or near sexual debut. Reactivation of a latent infection could imply waning immunity, and the patient might be at increased risk of persistence. Also discussed persistent HPV infection (defined as consecutively positive HPV results at least 12 months apart) is a necessary pathogenetic step for progression to clinically relevant disease. Most, if not all, patients with persistent HPV infection will be diagnosed with cervical intraepithelial neoplasia 2 or more (JONI 2+) within five to seven years, many in as few as two years.  -Plan to follow up pap smear and determine management plan thereafter. - UA/UCx ordered due to complaint of urinary frequency. To follow up and treat as indicated. - I spent the time noted on the day of this patient encounter preparing for, providing and documenting the above service. I have counseled and educated the patient on the differential, workup, disease course, and treatment/management plan. Education was provided to the patient during this encounter. All questions and concerns were answered and addressed in detail.

## 2024-08-13 NOTE — HISTORY OF PRESENT ILLNESS
[FreeTextEntry1] : GYN/Ref: Dr. Sabine Rebolledo PCP: Dr. Luis Fernando Wu Med Onc: Dr. Domingo Arguello ([facility] [place])  Ms. Nolasco, 58 years old, status post cone biopsy on 2023 for high-grade cervical dysplasia presents here today for follow-up Pap smear and surveillance visit. She is going well and without complaint. She denies fevers, chills, night sweats, chest pain, SOB, changes in appetite, nausea, vomiting, increased abdominal girth, unintentional weight loss, abdominopelvic pain, lower extremity edema or pain and changes in bowel/bladder movements. Denies vaginal bleeding and abnormal vaginal discharge.   Final pathology: 2023: Biopsy ([facility])  1. Cervix, 12 o'clock; cone biopsy: - Inflamed squamous and endocervical mucosa. - Changes related to prior biopsy. Note: Deeper levels were performed on block 1D. Immunohistochemical stain for p16 was also performed on block 1D. Each block has been reversed 180 degree and sectioned for microscopic examination. 2. Endocervix; curettings: - Unremarkable endocervical epithelium.  POB:  (1 TOP; 3 vaginal deliveries, ages 28, 24, 22) PGYN: Menarche age 18; LMP age 52 PMH: macroglobuinemia age 32; B cell large cell lymphoma age 42 Meds: none Allergies: NKDA PSH: RIH repair as a child (unknown mesh); thyroglossal duct cyst excision at age 9; lymphangioma excision age 16;  open ovarian cystectomy; right TKR; splenectomy at age 32. Social Hx: lives with her  and children; non smoker, 2 glasses of wine per night; no drugs. FH: mother breast cancer age 70 (negative for cancer gene mutation); maternal grandmother breast cancer age 50; sister breast cancer age 53; sister breast cancer age 46.

## 2024-08-14 LAB — HPV HIGH+LOW RISK DNA PNL CVX: NOT DETECTED

## 2024-08-19 LAB — CYTOLOGY CVX/VAG DOC THIN PREP: ABNORMAL

## 2024-11-22 NOTE — H&P PST ADULT - NSANTHOSAYNRD_GEN_A_CORE
Patient underwent excision of pilonidal cysts and debridement of the cavity deep to them.    For pain, alternate tylenol every 6hr and motrin every 6hr. For instance, Tylenol at noon, Motrin at 3pm, tylenol at 6pm.    There are no restrictions on your diet nor activity.    There is a dressing in place to hold pressure on the area. Remove this in 2 days and shower normally. No. WAYLON screening performed.  STOP BANG Legend: 0-2 = LOW Risk; 3-4 = INTERMEDIATE Risk; 5-8 = HIGH Risk

## 2024-12-24 PROBLEM — F10.90 ALCOHOL USE: Status: ACTIVE | Noted: 2018-12-11

## 2025-02-06 ENCOUNTER — NON-APPOINTMENT (OUTPATIENT)
Age: 60
End: 2025-02-06

## (undated) DEVICE — GLV 6 PROTEXIS (WHITE)

## (undated) DEVICE — FOLEY TRAY 16FR LF URINE METER SURESTEP

## (undated) DEVICE — VENODYNE/SCD SLEEVE CALF MEDIUM

## (undated) DEVICE — DRAPE 3/4 SHEET 52X76"

## (undated) DEVICE — PACK MAJOR ABDOMINAL WITH LAP

## (undated) DEVICE — SUT VICRYL 2-0 27" SH UNDYED

## (undated) DEVICE — DRSG TELFA 3 X 8

## (undated) DEVICE — DRAPE LIGHT HANDLE COVER (BLUE)

## (undated) DEVICE — WARMING BLANKET UPPER ADULT

## (undated) DEVICE — SYR LUER LOK 10CC

## (undated) DEVICE — DRAPE INSTRUMENT POUCH 6.75" X 11"

## (undated) DEVICE — DRAPE TOWEL BLUE 17" X 24"

## (undated) DEVICE — SUT SILK 2-0 18" FS

## (undated) DEVICE — GLV 6.5 PROTEXIS (BLUE)

## (undated) DEVICE — SOL IRR POUR H2O 250ML

## (undated) DEVICE — PACK PERI GYN

## (undated) DEVICE — ELCTR BOVIE BLADE 3/4" EXTENDED LENGTH 6"

## (undated) DEVICE — NDL HYPO REGULAR BEVEL 25G X 1.5" (BLUE)

## (undated) DEVICE — POSITIONER FOAM EGG CRATE ULNAR 2PCS (PINK)

## (undated) DEVICE — MEDICINE CUP WITH LID 60ML

## (undated) DEVICE — GOWN TRIMAX LG

## (undated) DEVICE — DRAPE FLUID WARMER 44 X 44"

## (undated) DEVICE — SUT POLYSORB 0 30" GS-22 UNDYED

## (undated) DEVICE — ELCTR BOVIE PENCIL SMOKE EVACUATION

## (undated) DEVICE — SOL IRR POUR NS 0.9% 500ML

## (undated) DEVICE — DRAPE MAGNETIC INSTRUMENT MEDIUM

## (undated) DEVICE — DRSG STERISTRIPS 0.5 X 4"

## (undated) DEVICE — POSITIONER PINK PAD PIGAZZI SYSTEM

## (undated) DEVICE — TUBING SUCTION 20FT

## (undated) DEVICE — DRAPE LAVH 124" X 30" X125"

## (undated) DEVICE — BASIN SET DOUBLE

## (undated) DEVICE — STAPLER SKIN MULTI DIRECTION W35

## (undated) DEVICE — SPECIMEN CONTAINER 100ML

## (undated) DEVICE — SUT VICRYL 3-0 27" SH UNDYED

## (undated) DEVICE — BLADE SURGICAL #11 CARBON

## (undated) DEVICE — DRSG MASTISOL

## (undated) DEVICE — DRAPE MAYO STAND 23"